# Patient Record
Sex: FEMALE | URBAN - METROPOLITAN AREA
[De-identification: names, ages, dates, MRNs, and addresses within clinical notes are randomized per-mention and may not be internally consistent; named-entity substitution may affect disease eponyms.]

---

## 2019-07-30 ENCOUNTER — TRANSFERRED RECORDS (OUTPATIENT)
Dept: HEALTH INFORMATION MANAGEMENT | Facility: CLINIC | Age: 44
End: 2019-07-30

## 2019-08-23 ENCOUNTER — TRANSFERRED RECORDS (OUTPATIENT)
Dept: HEALTH INFORMATION MANAGEMENT | Facility: CLINIC | Age: 44
End: 2019-08-23

## 2019-09-17 ENCOUNTER — TRANSFERRED RECORDS (OUTPATIENT)
Dept: HEALTH INFORMATION MANAGEMENT | Facility: CLINIC | Age: 44
End: 2019-09-17

## 2019-09-24 ENCOUNTER — TRANSFERRED RECORDS (OUTPATIENT)
Dept: HEALTH INFORMATION MANAGEMENT | Facility: CLINIC | Age: 44
End: 2019-09-24

## 2019-09-26 NOTE — TELEPHONE ENCOUNTER
RECORDS RECEIVED FROM: Levoscoliosis and chronic back pain- referred by [facility], Dr. Scottie Santos- MRI available from [facility]- records faxed to clinic from [facility]- appt per pt   DATE RECEIVED: Dec 11, 2019    NOTES STATUS DETAILS   OFFICE NOTE from referring provider Received  Eunice Martinezity   OFFICE NOTE from other specialist N/A    DISCHARGE SUMMARY from hospital N/A      DISCHARGE REPORT from the ER N/A    OPERATIVE REPORT N/A    MEDICATION LIST Received     IMPLANT RECORD/STICKER N/A    LABS     CBC/DIFF N/A    CULTURES N/A    INJECTIONS DONE IN RADIOLOGY N/A    MRI Received  [facility] 8/26/19   CT SCAN N/A    XRAYS (IMAGES & REPORTS) N/A    TUMOR     PATHOLOGY  Slides & report N/A      11/05/19 SE  10:48 AM  Received fax confirmation from [place] that my request was received and an imaging CD is coming in the mail    11/04/19 SE  3:09 PM  LVM for patient to determine if she has had hx surgery or images  4:09 PM after speaking with patient, called [facility]. [phone]    10/30/19   11:55 AM   Records from [place] scanned to chart\  Moni Carmela, CMA    09/26/19 SE  9:17 AM  Faxed request to [facility]   Faxed request to [facility]  12:54 PM per fax, patient has no records at [facility] in [place]

## 2019-11-21 DIAGNOSIS — M41.9 SCOLIOSIS: Primary | ICD-10-CM

## 2019-12-11 ENCOUNTER — PRE VISIT (OUTPATIENT)
Dept: ORTHOPEDICS | Facility: CLINIC | Age: 44
End: 2019-12-11

## 2020-02-10 ENCOUNTER — TELEPHONE (OUTPATIENT)
Dept: ORTHOPEDICS | Facility: CLINIC | Age: 45
End: 2020-02-10

## 2020-02-10 DIAGNOSIS — M41.9 SCOLIOSIS: Primary | ICD-10-CM

## 2020-02-10 NOTE — TELEPHONE ENCOUNTER
Writer called to follow up with Pt to make sure that all imaging and records have been received. Pt states that the only imaging that she has had on her back was the MRI and denies any surgeries on her back.     Estefania Desai LPN